# Patient Record
Sex: FEMALE | Race: WHITE
[De-identification: names, ages, dates, MRNs, and addresses within clinical notes are randomized per-mention and may not be internally consistent; named-entity substitution may affect disease eponyms.]

---

## 2017-01-08 ENCOUNTER — HOSPITAL ENCOUNTER (EMERGENCY)
Dept: HOSPITAL 9 - MADERS | Age: 22
Discharge: LEFT BEFORE DISCHARGE | End: 2017-01-08
Payer: COMMERCIAL

## 2017-01-08 DIAGNOSIS — Z79.899: ICD-10-CM

## 2017-01-08 DIAGNOSIS — R10.13: Primary | ICD-10-CM

## 2017-01-08 DIAGNOSIS — F41.9: ICD-10-CM

## 2017-01-08 DIAGNOSIS — E66.01: ICD-10-CM

## 2017-01-08 DIAGNOSIS — K21.9: ICD-10-CM

## 2017-01-08 DIAGNOSIS — E03.9: ICD-10-CM

## 2017-01-08 DIAGNOSIS — I10: ICD-10-CM

## 2017-01-08 DIAGNOSIS — F40.00: ICD-10-CM

## 2017-01-08 DIAGNOSIS — F31.9: ICD-10-CM

## 2017-01-08 PROCEDURE — 99284 EMERGENCY DEPT VISIT MOD MDM: CPT

## 2017-01-08 NOTE — ERRECORD
[facility]

                                EMERGENCY RECORD



HPI ABDOMINAL PAIN (15:23 LLDO)

CHIEF COMPLAINTS: Patient presents for evaluation of

      abdominal pain, Patient presents for evaluation of see

      triage note. pain mostly in the epigastrum withsome in both UQs.

      intermittent. 10 at worst but only 8 now (smiling). no diarrhea but

      has had n/v and some fever (s). supine and still makes it better.

      moving about makes it worse. only abdo surgery was  last

      year. baby still in [place] for bradycardia. HISTORIAN:

      History provided by patient, History provided by patient's

      family. LOCATION FEMALE: Symptoms are localized.

      QUALITY: Pain is dull in nature, described

      as aching, described as cramping, Described as similar

      to previous episodes, similar pain once before, dx's asgastritis.

      SEVERITY: Maximum severity of symptoms severe,

      Currently symptoms are moderate, does not seem to be

      in any discomfort at this time. TIME COURSE:

      Sudden onset of symptoms, Symptoms are

      intermittent, There has been no change in the patient's

      symptoms over time. ASSOCIATED WITH FEMALE: No

      associated recent antibiotic use, No associated bright red blood per

      rectum, No associated chills, No associated constipation, No

      associated diarrhea, Associated with fever, No associated

      flank pain, No associated groin pain, No associated hematemesis, No

      associated hematuria, Associated with loss of appetite,

      Associated with nausea, No associated trauma, No associated

      recent travel, No associated inability to tolerate oral intake, No

      associated urinary tract infection signs or symptoms, Associated

      with vomiting. MODIFYING FACTORS FEMALE:  2

      months post partum. RELIEVED BY: Patient's condition

      relieved by remaining still, Patient's condition relieved by supine

      position. EXACERBATED BY: Patient's condition

      exacerbated by movement, Patient's condition exacerbated by

      upright position, Patient's condition exacerbated by

      walking. RISK FACTORS FEMALE: No ectopic risk factors

      present, No abdominal aortic aneurysm risk factors, No coronary

      artery disease risk factors.



ROS

CONSTITUTIONAL: Historian reports fatigue, reports

      fever. Subjective fever of "LOW GRADE". (15:29 LLDO)

EYES: Negative eye review of systems, Historian denies eye pain,

      denies eye redness, denies eye discharge. (15:37 LLDO)

ENT: Negative ears, nose, throat review of systems, Historian

      denies epistaxis, denies rhinorrhea, denies sinus pain, denies sore

      throat. (15:37 LLDO)

CARDIOVASCULAR: Negative cardiovascular review of systems,

      Historian denies chest pain, no radiation, Historian denies

      diaphoresis, denies syncope. (15:37 LLDO)

RESPIRATORY: Negative respiratory review of systems, Historian

      denies cough, denies shortness of breath, denies sputum. (15:37 LLDO)



&a-1R&a+25V*p+0X*b4473G*c202B*c15G*c2P*p-0X&a-25V&a+1R          Name: Marlee Cardoso  : 1995 F21 MedRec: [id number]

                             AcctNum: [id number]

  Prepared: Sun 2017 15:41 by Interface                 Page 1 of 5

                                      pMD

                        [facility]

                                EMERGENCY RECORD



GI: Historian reports abdominal pain, reports

      anorexia, reports appetite changes, denies

      constipation, denies diarrhea, denies hematemesis, denies

      hematochezia, denies jaundice, denies melena, reports

      nausea, denies stool changes, reports vomiting. (15:29

      LLDO)

GENITOURINARY FEMALE: Negative genitourinary review of systems,

      Historian denies dysuria, denies frequency, denies urgency. (15:37

      LLDO)

MUSCULOSKELETAL: Negative musculoskeletal review of systems,

      Historian denies arthralgias, denies back pain, denies injury, denies

      myalgias, denies neck pain. (15:37 LLDO)

SKIN: Negative skin review of systems, Historian denies

      cellulitis, denies rash, denies skin changes, denies skin lesions.

      (15:37 LLDO)

NEUROLOGIC: Negative neurologic review of systems, Historian

      denies confusion, denies dizziness, denies focal weakness, denies

      mental status changes. (15:37 LLDO)

HEMO/LYMPHATIC: Normal hematologic/lymphatic system review,

      Historian denies abnormal blood clotting, denies gum bleeding, denies

      petechiae. (15:37 LLDO)

ALLERGIC/IMMUNOLOGIC: Normal allergy/immunologic system review,

      Historian denies eczema, denies environmental allergies, denies food

      allergies. (15:37 LLDO)

PSYCHIATRIC: Negative psychiatric review of systems, Historian

      denies alcohol abuse, denies anxiety, denies depression, denies drug

      abuse, denies hallucinations. (15:37 LLDO)

NOTES: All systems reviewed, negative except as described above.

      (15:29 LLDO)



PAST MEDICAL HISTORY

MEDICAL HISTORY: Notes: VERIFIED 17, Notes:

      MORBID OBESITY, Flu vaccine not up to date, Tetanus immunization up

      to date, Pneumococcal vaccine not up to date, Past medical history

      includes endocrine disease, hypothyroidism, Past medical history

      includes gastrointestinal disease, gastroesophageal reflux disease,

      Past medical history includes neurological disease, generalized

      seizures, Past medical history includes endocrine disease,

      hypothyroidism, Past medical history includes history of

      hypertension, Past medical history includes neurological disease,

      generalized seizures. (Sun 2017 14:59 JPER)

FEMALE SURGICAL HISTORY:  VERIFIED 17,

      Surgical history of  section. (Sun 2017

      14:59 JPER)

PSYCHIATRIC HISTORY: Notes: VERIFIED 17,

      Psychiatric history includes, anxiety, bipolar disorder,

      depression, Agoraphobia, Psychiatric history includes patient

      currently being under outpatient treatment. ([place] 2017

      14:59 JPER)

SOCIAL HISTORY: Social History includes VERIFIED



&a-1R&a+25V*p+0X*[id number]*c202B*c15G*c2P*p-0X&a-25V&a+1R          Name: Marlee Cardoso  : 1995 F21 MedRec: [id number]

                             AcctNum: [id number]

  Prepared: Sun 2017 15:41 by Interface                 Page 2 of 5

                                      pMD

                        [facility]

                                EMERGENCY RECORD



      17, Patient denies alcohol use, Patient denies drug

      use, Patient has no smoking history, Lives at home, with family,

      Patient denies alcohol use, Patient denies drug use, Patient has no

      smoking history, Lives at home, with family. ([place] 2017

      14:59 JPER)

NOTES: Nursing records reviewed, Agree with nursing records,

      Medication list reviewed. (15:37 LLDO)



KNOWN ALLERGIES

No Known Allergies (Unconfirmed)

Sulfa (Sulfonamide Antibiotics): - ENTERED 3/24/2015



CURRENT MEDICATIONS

levothyroxine: 

      TABLET : Strength - 150 mcg : ORAL

      Patient Dose: 1 tab(s) Oral once a day (in the morning).

      (15:00 JPER)

Keppra: 

      TABLET : Strength - 1,000 mg : ORAL

      Patient Dose: 1250 mg Oral 2 times a day. (15:00 JPER)

Benadryl: 

      CAPSULE : Strength - 25 mg : ORAL

      Patient Dose: 1 cap(s) Oral once a day (at bedtime). (15:00

      JPER)

Depakote: 

      TABLET, DELAYED RELEASE (ENTERIC COATED) : Strength - 250 mg : ORAL

      Patient Dose: Unknown. (15:00 JPER)

None (15:01 JPER)

Ativan: 

      TABLET : Strength - 1 mg : ORAL

      Patient Dose: 0.25 3 times a day. (15:01 JPER)



VITAL SIGNS (14:55 JPER)

VITAL SIGNS: BP: 117/62, Pulse: 90, Resp: 20, Temp: 98.5 (Oral),

      Pain: 8, O2 sat: 98 on Room Air, Time: 2017 14:55.



PHYSICAL EXAM

CONSTITUTIONAL: Vital Signs Reviewed, Patient afebrile, Pulse

      normal, Blood pressure normal, Respiratory rate normal, Patient

      appears, uncomfortable, Patient appears,

      in moderate pain distress, Patient alert and oriented to

      person, place and time, Nursing notes reviewed. (15:30 LLDO)

HEAD: Head exam normal, Head exam included findings of head

      atraumatic, normocephalic. (15:37 LLDO)

EYES: Eye exam normal, Eye exam included findings of eyelids

      normal to inspection, Pupils equally round and reactive to light,

      Extraocular muscles intact. (15:37 LLDO)

ENT: ENT exam normal, Ear exam normal, Nose exam normal. (15:37

      LLDO)

NECK: Neck exam normal, Neck exam included findings of normal



&a-1R&a+25V*p+0X*c5455L*c202B*c15G*c2P*p-0X&a-25V&a+1R          Name: Marlee Cardoso  : 1995 F21 MedRec: [id number]

                             AcctNum: [id number]

  Prepared: Sun 2017 15:41 by Interface                 Page 3 of 5

                                      pMD

                        [facility]

                                EMERGENCY RECORD



      range of motion, Trachea midline, no meningeal signs, no tenderness.

      (15:37 LLDO)

RESPIRATORY CHEST: Respiratory and chest exam normal,

      Respiratory exam included findings of, Chest exam included

      findings of chest movement symmetrical, Chest expansion equal. (15:37

      LLDO)

CARDIOVASCULAR: Cardiovascular assessment normal, Cardiovascular

      exam included findings of heart rate regular rate and rhythm, Heart

      sounds normal. (15:37 LLDO)

ABDOMEN FEMALE: Abdominal exam included findings of abdomen

      tender, to the epigastric region, moderate

      intensity, Bowel sounds, hyperactive, Liver

      normal, Spleen normal, no mass, no pulsatile masses, no peritoneal

      signs. (15:30 LLDO)

BACK: Back exam normal, Back exam included findings of normal

      inspection, range of motion normal. (15:37 LLDO)

UPPER EXTREMITY: Upper extremity exam normal, Upper extremity

      exam included findings of inspection normal, Range of motion normal.

      (15:37 LLDO)

LOWER EXTREMITY: Lower extremity exam normal, Lower extremity

      exam included findings of inspection normal, Range of motion normal.

      (15:37 LLDO)

NEURO: Neuro exam normal, Neuro exam findings include patient

      oriented to person, place and time, Speech normal, Loulou coma scale

      15. (15:37 LLDO)

SKIN: Skin exam normal, Skin exam included findings of skin warm,

      dry, and normal in color, no rash. (15:37 LLDO)

PSYCHIATRIC: Psychiatric exam normal, Psychiatric exam included

      findings of patient oriented to person place and time, Normal affect.

      (15:37 LLDO)



MEDICATION ADMINISTRATION SUMMARY



      Drug Name: *GI COCKTAIL- GREEN, Dose Ordered: * , Route: Oral,

      Status: Ordered, Time: 15:17 2017,

      Drug Name: Protonix intravenous, Dose Ordered: 40 mg, Route: IV Push,

      Status: Ordered, Time: 15:16 2017,

      Drug Name: Toradol injection, Dose Ordered: 30 mg, Route: IV Push,

      Status: Ordered, Time: 15:15 2017,

      Drug Name: Zofran intravenous, Dose Ordered: 8 mg, Route: IV Push,

      Status: Ordered, Time: 15:15 2017, *Additional information

      available in notes, Detailed record available in Medication Service

      section.



DOCTOR NOTES (15:21 LLDO)

TEXT:  pt apparently walked out the door--w/o telling

      anybody--just after the hx and pe were done.



PROBLEM LIST

  No recorded problems



&a-1R&a+25V*p+0X*[id number]*c202B*c15G*c2P*p-0X&a-25V&a+1R          Name: Marlee Cardoso  : 1995 F21 MedRec: [id number]

                             AcctNum: [id number]

  Prepared: Sun 2017 15:41 by Interface                 Page 4 of 5

                                      pMD

                        [facility]

                                EMERGENCY RECORD





DIAGNOSIS (15:33 JPER)

FINAL: PRIMARY: AMA.



PRESCRIPTION

  No recorded prescriptions



DISPOSITION (15:33 JPER)

PATIENT:  Disposition Type: Eloped, Disposition: Against Medical

      Advice, Patient left the department.

[place]:

  OMERO=MATTHIAS Alcantar, Lorraine  LLDO=MD Tabitha, Francisco















































































&a-1R&a+25V*p+0X*[id number]*c202B*c15G*c2P*p-0X&a-25V&a+1R          Name: Marlee Cardoso  : 1995 F21 MedRec: [id number]

                             AcctNum: [id number]

  Prepared: Sun 2017 15:41 by Interface                 Page 5 of 5

                                      pMD

MTDD

## 2017-01-08 NOTE — PICIS
[facility]

                                EMERGENCY RECORD



TRIAGE (Sun 2017 14:59 JPER)

PATIENT: NAME: Marlee Cardoso, AGE: 21, GENDER: female, :

      Mon Mar 24, 1995, TIME OF GREET: Sun 2017 14:45, PREFERRED

      LANGUAGE: English, RACE: WHITE, ETHNICITY: Not  or ,

      FALL RISK: NO, ECODE BILLING MAP: [facility], SSN:

      [id number], Zip Code: [address], KG WEIGHT: 131.54, PHONE: [phone],

      MEDICAL RECORD NUMBER: [id number], ACCOUNT NUMBER: [id number],

      PERSON ID: [id number], PCP: NO PCP. (Sun 2017 14:59 JPER)

COMPLAINT:  ABDOMINAL PAIN,NAUSEA. (Sun 2017 14:59 JPER)

ADMISSION: URGENCY: 3 Urgent, ADMISSION SOURCE: Home, TRANSPORT:

      Walk-in, BED: ED -05. (Sun 2017 14:59 JPER)

ASSESSMENT: Assessment: PT C/O EPIGASTRIC PAIN X 2 DAYS; NAUSEA

      WITHOUT VOMITING. (Sun 2017 14:59 JPER)

PAIN: Patient complains of pain described as,

      burning, pressure, sharp, on a

      scale 0-10 patient rates pain as 8, Pain is constant,

      No aggravating factors, No relieving factors. (Sun 2017 14:59

      JPER)

IMMUNIZATIONS: Flu vaccine up to date, Tetanus immunization up to

      date. (Sun 2017 14:59 JPER)

SIRS SCORING: Heart Rate  (0), Temp range 96.8-101.1 (0),

      respiratory rate 12-24 (0), Mental Status altered: no (0). (Sun Reggie

      08, 2017 14:59 JPER)

TRIAGE SCREENING: Patient denies suicidal ideation, Patient

      denies presence of domestic violence. (Sun 2017 14:59 JPER)

LMP: Last menstrual period: 12-15-16, , P: 1. (Sun Reggie 08,

      2017 14:59 JPER)

PROVIDERS: TRIAGE NURSE: oLrraine Alcantar RN. (Sun 2017 14:59

      JPER)

VITAL SIGNS: /62, Pulse 90, Resp 20, Temp 98.5, (Oral),

      Pain 8, O2 Sat 98, on Room Air, Time 2017 14:55. (14:55 JPER)

PREVIOUS VISIT ALLERGIES: Sulfa (Sulfonamide Antibiotics). (Sun

      2017 14:59 JPER)



KNOWN ALLERGIES

No Known Allergies (Unconfirmed)

Sulfa (Sulfonamide Antibiotics): - ENTERED 3/24/2015



CURRENT MEDICATIONS

levothyroxine: 

      TABLET : Strength - 150 mcg : ORAL

      Patient Dose: 1 tab(s) Oral once a day (in the morning).

      (15:00 JPER)

Keppra: 

      TABLET : Strength - 1,000 mg : ORAL

      Patient Dose: 1250 mg Oral 2 times a day. (15:00 JPER)

Benadryl: 

      CAPSULE : Strength - 25 mg : ORAL

      Patient Dose: 1 cap(s) Oral once a day (at bedtime). (15:00

      JPER)



&a-1R&a+25V*p+0X*e8296D*c202B*c15G*c2P*p-0X&a-25V&a+1R          Name: Marlee Cardoso YANA  : 1995 F21 MedRec: [id number]

                             AcctNum: [id number]

  Prepared: Sun 2017 15:48 by Interface                 Page 1 of 7

                                      pMD

                        [facility]

                                EMERGENCY RECORD



Depakote: 

      TABLET, DELAYED RELEASE (ENTERIC COATED) : Strength - 250 mg : ORAL

      Patient Dose: Unknown. (15:00 JPER)

None (15:01 JPER)

Ativan: 

      TABLET : Strength - 1 mg : ORAL

      Patient Dose: 0.25 3 times a day. (15:01 JPER)



VITAL SIGNS (14:55 JPER)

VITAL SIGNS: BP: 117/62, Pulse: 90, Resp: 20, Temp: 98.5 (Oral),

      Pain: 8, O2 sat: 98 on Room Air, Time: 2017 14:55.



NURSING ASSESSMENT: ABDOMEN (15:00 JPER)

CONSTITUTIONAL: Patient arrives ambulatory, Gait steady, History

      obtained from patient, Patient appears comfortable, Patient

      cooperative, Patient alert, Oriented to person, place and time, Skin

      warm, Skin dry, Skin normal in color, Mucous membranes pink, Mucous

      membranes moist, Patient is well-groomed, Patient complains of

      EPIGASTRIC PAIN.

PAIN: burning pain, pressure pain, sharp

      pain, to the epigastric region, on a scale 0-10

      patient rates pain as 8.

LMP: First day last menstrual period, Last period started on

      12/15/2016.

GENITOURINARY FEMALE: Notes: DEFERRED; UA REQUESTED.

NOTES: Emotional support needed and given.



NURSING PROCEDURE: NURSE NOTES (15:25 JPER)

NURSES NOTES: Notes: ROOM FOUND EMPTY; PT ELOPED.



ORDER DETAILS



      Order Name: Amylase, Status: Active, Time: 15:04 2017, User:

      [email or web address],

       - Ordered for: MD Lu Lloyd,

       - Entered by: MD Lu Lloyd - Radhika 2017 15:04,

       - Quantity: 1,

      Order Name: CBC with Differential, Status: Active, Time: 15:04

      2017, User: [email or web address],

       - Ordered for: MD Lu Lloyd,

       - Entered by: MD Lu Lloyd - Radhika 2017 15:04,

       - Quantity: 1,

      Order Name: Comprehensive Metabolic Panel, Status: Active, Time:

      15:04 2017, User: [email or web address],

       - Ordered for: MD Lu Lloyd,

       - Entered by: MD Lu Lloyd - Radhika 2017 15:04,

       - Quantity: 1,

      Order Name: Culture, Urine, Status: Active, Time: 15:04 2017,

      User: [email or web address],

       - Ordered for: MD Lu Lloyd,



&a-1R&a+25V*p+0X*l5589A*c202B*c15G*c2P*p-0X&a-25V&a+1R          Name: Marlee Cardoso  : 1995 F21 MedRec: [id number]

                             AcctNum: [id number]

  Prepared: Sun 2017 15:48 by Interface                 Page 2 of 7

                                      pMD

                        [facility]

                                EMERGENCY RECORD



       - Entered by: MD Lu Lloyd - Radhika 2017 15:04,

       - Quantity: 1,

      Order Name: Lipase, Status: Active, Time: 15:04 2017, User: [email or web address],

       - Ordered for: MD Lu Lloyd,

       - Entered by: MD Lu Lloyd - Sun 2017 15:04,

       - Quantity: 1,

      Order Name: Pregnancy Test, Serum (BHCG), Status: Active, Time: 15:19

      2017, User: [email or web address],

       - Ordered for: MD Lu Lloyd,

       - Entered by: MD Lu Lloyd - Sun 2017 15:19,

       - Quantity: 1,

      Order Name: SALINE LOCK, Status: Active, Time: 15:04 2017, User:

      [email or web address],

       - Ordered for: MD Lu Lloyd,

       - Entered by: MD Lu Lloyd - Sun 2017 15:04,

       - Quantity: 1,

      Order Name: Urinalysis with Microscopic, Status: Active, Time: 15:04

      2017, User: [email or web address],

       - Ordered for: MD Lu Lloyd,

       - Entered by: MD Tabitha, Francisco Grarido 2017 15:04,

       - Quantity: 1.



MEDICATION ADMINISTRATION SUMMARY



      Drug Name: *GI COCKTAIL- GREEN, Dose Ordered: * , Route: Oral,

      Status: Ordered, Time: 15:17 2017,

      Drug Name: Protonix intravenous, Dose Ordered: 40 mg, Route: IV Push,

      Status: Ordered, Time: 15:16 2017,

      Drug Name: Toradol injection, Dose Ordered: 30 mg, Route: IV Push,

      Status: Ordered, Time: 15:15 2017,

      Drug Name: Zofran intravenous, Dose Ordered: 8 mg, Route: IV Push,

      Status: Ordered, Time: 15:15 2017, *Additional information

      available in notes, Detailed record available in Medication Service

      section.



MEDICATION SERVICE

GI COCKTAIL- GREEN:  Free Text order: GI COCKTAIL- GREEN : Dose:

      40 mL : Oral Donnatal (phenobarbital/Hyoscyamine Sulfate/atropine

      sulfate/Scopolamine Hydrobromide) [10 mL] Lidocaine Viscous

      (lidocaine HCl) [10 mL] MAG-AL (magnesium hydr : Oral

      Ordered by: Francisco Lu MD

      Entered by: MD Radhika Rutherford 2017 15:17 .

Protonix intravenous:  Order: Protonix intravenous (pantoprazole

      sodium) - Dose: 40 mg : IV Push

      Schedule: Now

      Ordered by: Francisco Lu MD

      Entered by: MD Radhika Rutherford 2017 15:16 .

Toradol injection:  Order: Toradol injection (ketorolac

      tromethamine) - Dose: 30 mg : IV Push

      Schedule: Now



&a-1R&a+25V*p+0X*p4151G*c202B*c15G*c2P*p-0X&a-25V&a+1R          Name: Marlee Cardoso  : 1995 F21 MedRec: [id number]

                             AcctNum: [id number]

  Prepared: Sun 2017 15:48 by Interface                 Page 3 of 7

                                      pMD

                        [facility]

                                EMERGENCY RECORD



      Ordered by: Francisco Lu MD

      Entered by: MD Radhika Rutherford 2017 15:15 .

Zofran intravenous:  Order: Zofran intravenous (ondansetron HCl)

      - Dose: 8 mg : IV Push

      Schedule: Now

      Ordered by: Francisco Lu MD

      Entered by: MD Radhika Rutherford 2017 15:15 .



HPI ABDOMINAL PAIN (15:23 LLDO)

CHIEF COMPLAINTS: Patient presents for evaluation of

      abdominal pain, Patient presents for evaluation of see

      triage note. pain mostly in the epigastrum withsome in both UQs.

      intermittent. 10 at worst but only 8 now (smiling). no diarrhea but

      has had n/v and some fever (s). supine and still makes it better.

      moving about makes it worse. only abdo surgery was  last

      year. baby still in [place] for bradycardia. HISTORIAN:

      History provided by patient, History provided by patient's

      family. LOCATION FEMALE: Symptoms are localized.

      QUALITY: Pain is dull in nature, described

      as aching, described as cramping, Described as similar

      to previous episodes, similar pain once before, dx's asgastritis.

      SEVERITY: Maximum severity of symptoms severe,

      Currently symptoms are moderate, does not seem to be

      in any discomfort at this time. TIME COURSE:

      Sudden onset of symptoms, Symptoms are

      intermittent, There has been no change in the patient's

      symptoms over time. ASSOCIATED WITH FEMALE: No

      associated recent antibiotic use, No associated bright red blood per

      rectum, No associated chills, No associated constipation, No

      associated diarrhea, Associated with fever, No associated

      flank pain, No associated groin pain, No associated hematemesis, No

      associated hematuria, Associated with loss of appetite,

      Associated with nausea, No associated trauma, No associated

      recent travel, No associated inability to tolerate oral intake, No

      associated urinary tract infection signs or symptoms, Associated

      with vomiting. MODIFYING FACTORS FEMALE:  2

      months post partum. RELIEVED BY: Patient's condition

      relieved by remaining still, Patient's condition relieved by supine

      position. EXACERBATED BY: Patient's condition

      exacerbated by movement, Patient's condition exacerbated by

      upright position, Patient's condition exacerbated by

      walking. RISK FACTORS FEMALE: No ectopic risk factors

      present, No abdominal aortic aneurysm risk factors, No coronary

      artery disease risk factors.



ROS

CONSTITUTIONAL: Historian reports fatigue, reports

      fever. Subjective fever of "LOW GRADE". (15:29 LLDO)

EYES: Negative eye review of systems, Historian denies eye pain,

      denies eye redness, denies eye discharge. (15:37 LLDO)



&a-1R&a+25V*p+0X*z5179B*c202B*c15G*c2P*p-0X&a-25V&a+1R          Name: Marlee Cardoso  : 1995 F21 MedRec: [id number]

                             AcctNum: [id number]

  Prepared: Sun 2017 15:48 by Interface                 Page 4 of 7

                                      pMD

                        [facility]

                                EMERGENCY RECORD



ENT: Negative ears, nose, throat review of systems, Historian

      denies epistaxis, denies rhinorrhea, denies sinus pain, denies sore

      throat. (15:37 LLDO)

CARDIOVASCULAR: Negative cardiovascular review of systems,

      Historian denies chest pain, no radiation, Historian denies

      diaphoresis, denies syncope. (15:37 LLDO)

RESPIRATORY: Negative respiratory review of systems, Historian

      denies cough, denies shortness of breath, denies sputum. (15:37 LLDO)

GI: Historian reports abdominal pain, reports

      anorexia, reports appetite changes, denies

      constipation, denies diarrhea, denies hematemesis, denies

      hematochezia, denies jaundice, denies melena, reports

      nausea, denies stool changes, reports vomiting. (15:29

      LLDO)

GENITOURINARY FEMALE: Negative genitourinary review of systems,

      Historian denies dysuria, denies frequency, denies urgency. (15:37

      LLDO)

MUSCULOSKELETAL: Negative musculoskeletal review of systems,

      Historian denies arthralgias, denies back pain, denies injury, denies

      myalgias, denies neck pain. (15:37 LLDO)

SKIN: Negative skin review of systems, Historian denies

      cellulitis, denies rash, denies skin changes, denies skin lesions.

      (15:37 LLDO)

NEUROLOGIC: Negative neurologic review of systems, Historian

      denies confusion, denies dizziness, denies focal weakness, denies

      mental status changes. (15:37 LLDO)

HEMO/LYMPHATIC: Normal hematologic/lymphatic system review,

      Historian denies abnormal blood clotting, denies gum bleeding, denies

      petechiae. (15:37 LLDO)

ALLERGIC/IMMUNOLOGIC: Normal allergy/immunologic system review,

      Historian denies eczema, denies environmental allergies, denies food

      allergies. (15:37 LLDO)

PSYCHIATRIC: Negative psychiatric review of systems, Historian

      denies alcohol abuse, denies anxiety, denies depression, denies drug

      abuse, denies hallucinations. (15:37 LLDO)

NOTES: All systems reviewed, negative except as described above.

      (15:29 LLDO)



PAST MEDICAL HISTORY

MEDICAL HISTORY: Notes: VERIFIED 17, Notes:

      MORBID OBESITY, Flu vaccine not up to date, Tetanus immunization up

      to date, Pneumococcal vaccine not up to date, Past medical history

      includes endocrine disease, hypothyroidism, Past medical history

      includes gastrointestinal disease, gastroesophageal reflux disease,

      Past medical history includes neurological disease, generalized

      seizures, Past medical history includes endocrine disease,

      hypothyroidism, Past medical history includes history of

      hypertension, Past medical history includes neurological disease,

      generalized seizures. (Sun 2017 14:59 JPER)

FEMALE SURGICAL HISTORY:  VERIFIED 17,



&a-1R&a+25V*p+0X*r1714V*c202B*c15G*c2P*p-0X&a-25V&a+1R          Name: Marlee Cardoso YANA  : 1995 F21 MedRec: [id number]

                             AcctNum: [id number]

  Prepared: Sun 2017 15:48 by Interface                 Page 5 of 7

                                      pMD

                        [facility]

                                EMERGENCY RECORD



      Surgical history of  section. (Sun 2017

      14:59 JPER)

PSYCHIATRIC HISTORY: Notes: VERIFIED 17,

      Psychiatric history includes, anxiety, bipolar disorder,

      depression, Agoraphobia, Psychiatric history includes patient

      currently being under outpatient treatment. ([place] 2017

      14:59 JPER)

SOCIAL HISTORY: Social History includes VERIFIED

      17, Patient denies alcohol use, Patient denies drug

      use, Patient has no smoking history, Lives at home, with family,

      Patient denies alcohol use, Patient denies drug use, Patient has no

      smoking history, Lives at home, with family. ([place] 2017

      14:59 JPER)

NOTES: Nursing records reviewed, Agree with nursing records,

      Medication list reviewed. (15:37 LLDO)



PHYSICAL EXAM

CONSTITUTIONAL: Vital Signs Reviewed, Patient afebrile, Pulse

      normal, Blood pressure normal, Respiratory rate normal, Patient

      appears, uncomfortable, Patient appears,

      in moderate pain distress, Patient alert and oriented to

      person, place and time, Nursing notes reviewed. (15:30 LLDO)

HEAD: Head exam normal, Head exam included findings of head

      atraumatic, normocephalic. (15:37 LLDO)

EYES: Eye exam normal, Eye exam included findings of eyelids

      normal to inspection, Pupils equally round and reactive to light,

      Extraocular muscles intact. (15:37 LLDO)

ENT: ENT exam normal, Ear exam normal, Nose exam normal. (15:37

      LLDO)

NECK: Neck exam normal, Neck exam included findings of normal

      range of motion, Trachea midline, no meningeal signs, no tenderness.

      (15:37 LLDO)

RESPIRATORY CHEST: Respiratory and chest exam normal,

      Respiratory exam included findings of, Chest exam included

      findings of chest movement symmetrical, Chest expansion equal. (15:37

      LLDO)

CARDIOVASCULAR: Cardiovascular assessment normal, Cardiovascular

      exam included findings of heart rate regular rate and rhythm, Heart

      sounds normal. (15:37 LLDO)

ABDOMEN FEMALE: Abdominal exam included findings of abdomen

      tender, to the epigastric region, moderate

      intensity, Bowel sounds, hyperactive, Liver

      normal, Spleen normal, no mass, no pulsatile masses, no peritoneal

      signs. (15:30 LLDO)

BACK: Back exam normal, Back exam included findings of normal

      inspection, range of motion normal. (15:37 LLDO)

UPPER EXTREMITY: Upper extremity exam normal, Upper extremity

      exam included findings of inspection normal, Range of motion normal.

      (15:37 LLDO)

LOWER EXTREMITY: Lower extremity exam normal, Lower extremity



&a-1R&a+25V*p+0X*e7068X*c202B*c15G*c2P*p-0X&a-25V&a+1R          Name: Marlee Cardoso  : 1995 11 MedRec: [id number]

                             AcctNum: [id number]

  Prepared: Sun 2017 15:48 by Interface                 Page 6 of 7

                                      pMD

                        [facility]

                                EMERGENCY RECORD



      exam included findings of inspection normal, Range of motion normal.

      (15:37 LLDO)

NEURO: Neuro exam normal, Neuro exam findings include patient

      oriented to person, place and time, Speech normal, [place] coma scale

      15. (15:37 LLDO)

SKIN: Skin exam normal, Skin exam included findings of skin warm,

      dry, and normal in color, no rash. (15:37 LLDO)

PSYCHIATRIC: Psychiatric exam normal, Psychiatric exam included

      findings of patient oriented to person place and time, Normal affect.

      (15:37 LLDO)



EVENTS

TRANSFER:  Triage to Emergency Main ED -05. (14:59 JPER)

   Removed from Emergency Main ED -05. (15:33 JPER)



DOCTOR NOTES (15:21 LLDO)

TEXT:  pt apparently walked out the door--w/o telling

      anybody--just after the hx and pe were done.



PROBLEM LIST

  No recorded problems



DIAGNOSIS (15:33 JPER)

FINAL: PRIMARY: AMA.



DISPOSITION (15:33 JPER)

PATIENT:  Disposition Type: Eloped, Disposition: Against Medical

      Advice, Patient left the department.



PRESCRIPTION

  No recorded prescriptions



IMAGING (15:30 JPER)

AMA/LWBS:  Image captured from scanner.

*SUPPLY CHARGE SHEET:  Image captured from scanner.



ADMIN (15:40 LLDO)

DIGITAL SIGNATURE:  MD Tabitha, Francisco.

   MD Tabitha, Francisco.

[place]:

  JPER=MATTHIAS Alcantar, Lorraine  LLDO=MD Tabitha, Francisco

















&a-1R&a+25V*p+0X*j4946V*c202B*c15G*c2P*p-0X&a-25V&a+1R          Name: Marlee Cardoso  : 1995 11 MedRec: [id number]

                             AcctNum: [id number]

  Prepared: Sun 2017 15:48 by Interface                 Page 7 of 7

                                      pMD

MTDD

## 2017-02-02 ENCOUNTER — HOSPITAL ENCOUNTER (EMERGENCY)
Dept: HOSPITAL 9 - MADERS | Age: 22
Discharge: HOME | End: 2017-02-02
Payer: COMMERCIAL

## 2017-02-02 DIAGNOSIS — Z79.899: ICD-10-CM

## 2017-02-02 DIAGNOSIS — I10: ICD-10-CM

## 2017-02-02 DIAGNOSIS — N39.0: ICD-10-CM

## 2017-02-02 DIAGNOSIS — E03.9: ICD-10-CM

## 2017-02-02 DIAGNOSIS — K21.9: ICD-10-CM

## 2017-02-02 DIAGNOSIS — E66.01: ICD-10-CM

## 2017-02-02 DIAGNOSIS — F41.9: ICD-10-CM

## 2017-02-02 DIAGNOSIS — B34.9: Primary | ICD-10-CM

## 2017-02-02 DIAGNOSIS — G40.409: ICD-10-CM

## 2017-02-02 DIAGNOSIS — F31.9: ICD-10-CM

## 2017-02-02 LAB
ALBUMIN SERPL BCG-MCNC: 4 G/DL (ref 3.5–5)
ALP SERPL-CCNC: 67 U/L (ref 40–150)
ALT SERPL W P-5'-P-CCNC: 32 U/L (ref 0–55)
ANION GAP SERPL CALC-SCNC: 19 MMOL/L (ref 10–20)
AST SERPL-CCNC: 19 U/L (ref 5–30)
BACTERIA UR QL AUTO: (no result) HPF
BASOPHILS # BLD AUTO: 0.1 THOU/UL (ref 0–0.2)
BASOPHILS NFR BLD AUTO: 0.7 % (ref 0–1)
BILIRUB SERPL-MCNC: 0.4 MG/DL (ref 0.2–1.2)
BUN SERPL-MCNC: 12 MG/DL (ref 8.4–21)
CALCIUM SERPL-MCNC: 9 MG/DL (ref 7.8–10.44)
CHLORIDE SERPL-SCNC: 101 MMOL/L (ref 98–107)
CO2 SERPL-SCNC: 23 MMOL/L (ref 22–29)
CREAT CL PREDICTED SERPL C-G-VRATE: 0 ML/MIN (ref 70–130)
DRUG SCREEN CUTOFF: (no result)
EOSINOPHIL # BLD AUTO: 0 THOU/UL (ref 0–0.7)
EOSINOPHIL NFR BLD AUTO: 0.1 % (ref 0–10)
GLOBULIN SER CALC-MCNC: 2.8 G/DL (ref 2.4–3.5)
GLUCOSE SERPL-MCNC: 108 MG/DL (ref 70–105)
HGB BLD-MCNC: 11.7 G/DL (ref 12–16)
LYMPHOCYTES # BLD AUTO: 1.5 THOU/UL (ref 1.2–3.4)
LYMPHOCYTES NFR BLD AUTO: 14.6 % (ref 28–48)
MCH RBC QN AUTO: 29.2 PG (ref 25–35)
MCV RBC AUTO: 84.9 FL (ref 77–87)
MEDTOX CONTROL LINE VALID?: (no result)
MONOCYTES # BLD AUTO: 0.5 THOU/UL (ref 0.11–0.59)
MONOCYTES NFR BLD AUTO: 4.6 % (ref 0–4)
NEUTROPHILS # BLD AUTO: 8 THOU/UL (ref 1.4–6.5)
NEUTROPHILS NFR BLD AUTO: 80.1 % (ref 31–61)
PLATELET # BLD AUTO: 290 THOU/UL (ref 130–400)
POTASSIUM SERPL-SCNC: 4.2 MMOL/L (ref 3.5–5.1)
PROT UR STRIP.AUTO-MCNC: 100 MG/DL
RBC # BLD AUTO: 4.02 MILL/UL (ref 4–5.2)
RBC UR QL AUTO: (no result) HPF (ref 0–3)
SODIUM SERPL-SCNC: 139 MMOL/L (ref 136–145)
SP GR UR STRIP: 1.02 (ref 1–1.03)
WBC # BLD AUTO: 10 THOU/UL (ref 4.8–10.8)
WBC UR QL AUTO: (no result) HPF (ref 0–3)

## 2017-02-02 PROCEDURE — 36415 COLL VENOUS BLD VENIPUNCTURE: CPT

## 2017-02-02 PROCEDURE — 93005 ELECTROCARDIOGRAM TRACING: CPT

## 2017-02-02 PROCEDURE — 87086 URINE CULTURE/COLONY COUNT: CPT

## 2017-02-02 PROCEDURE — 81015 MICROSCOPIC EXAM OF URINE: CPT

## 2017-02-02 PROCEDURE — 85025 COMPLETE CBC W/AUTO DIFF WBC: CPT

## 2017-02-02 PROCEDURE — 71010: CPT

## 2017-02-02 PROCEDURE — 81003 URINALYSIS AUTO W/O SCOPE: CPT

## 2017-02-02 PROCEDURE — 80053 COMPREHEN METABOLIC PANEL: CPT

## 2017-02-02 PROCEDURE — 80306 DRUG TEST PRSMV INSTRMNT: CPT

## 2017-02-02 PROCEDURE — 84443 ASSAY THYROID STIM HORMONE: CPT

## 2017-02-02 NOTE — RAD
PORTABLE CHEST:

2/2/17

 

HISTORY: 

Shortness of breath. 

 

COMPARISON:  

4/21/15 exam. 

 

Film technique is suboptimal related to body habitus. Increased density in the bases is felt to be o
n the basis of soft tissue. Heart size and mediastinum are within normal limits. The lungs are clear
 of infiltrates. 

 

IMPRESSION:  

No active intrathoracic disease. 

 

POS: [facility]

## 2017-02-02 NOTE — ERRECORD
[facility]

                                EMERGENCY RECORD



HPI COUGH (17:47 SHAN)

CHIEF COMPLAINT: Patient presents for evaluation of

      cough, non-productive. HISTORIAN: History

      provided by patient, feels like lungs tight; also heart fast.

      ongoing for a few hours. EXACERBATED BY: Patient's

      condition exacerbated by nothing. RELIEVED BY:

      Patient's condition relieved by nothing.



ROS (17:50 SHAN)

CONSTITUTIONAL: Negative constitutional review of systems,

      Historian denies chills, denies fever.

EYES: Negative eye review of systems.

ENT: Negative ears, nose, throat review of systems.

CARDIOVASCULAR: Negative cardiovascular review of systems,

      Historian denies chest pain, denies palpitations.

RESPIRATORY: Negative respiratory review of systems, Historian

      denies cough, denies shortness of breath.

GI: Negative gastrointestinal review of systems, Historian denies

      abdominal pain, denies constipation, denies diarrhea.

MUSCULOSKELETAL: Negative musculoskeletal review of systems.

SKIN: Negative skin review of systems.

NEUROLOGIC: Negative neurologic review of systems.

ENDOCRINE: Negative endocrine review of systems.

HEMO/LYMPHATIC: Normal hematologic/lymphatic system review.

PSYCHIATRIC: Negative psychiatric review of systems.

NOTES:  All other ROS is negative except as listed in

      HPI.



PAST MEDICAL HISTORY

MEDICAL HISTORY:  Notes: VERIFIED 17, Notes: MORBID

      OBESITY, Flu vaccine not up to date, Tetanus immunization up to date,

      Pneumococcal vaccine not up to date, Past medical history includes

      endocrine disease, hypothyroidism, Past medical history includes

      gastrointestinal disease, gastroesophageal reflux disease, Past

      medical history includes neurological disease, generalized seizures,

      Past medical history includes endocrine disease, hypothyroidism, Past

      medical history includes history of hypertension, Past medical

      history includes neurological disease, generalized seizures.

      (17:29 VANDNAA)

FEMALE SURGICAL HISTORY:  VERIFIED 17, Surgical history

      of  section. (17:29 VANDANA)

PSYCHIATRIC HISTORY:  Notes: VERIFIED 17, Psychiatric

      history includes, anxiety, bipolar disorder, depression, Agoraphobia,

      Psychiatric history includes patient currently being under outpatient

      treatment. (17:29 VANDANA)

SOCIAL HISTORY:  Social History includes VERIFIED 17,

      Patient denies alcohol use, Patient denies drug use, Patient has no

      smoking history, Lives at home, with family, Patient denies alcohol

      use, Patient denies drug use, Patient has no smoking history, Lives

      at home, with family. (17:29 VANDANA)



&a-1R&a+25V*p+0X*w1740M*c202B*c15G*c2P*p-0X&a-25V&a+1R          Name: Marlee Cardoso  : 1995 F21 MedRec: [id number]

                             AcctNum: [id number]

  Prepared:  19:54 by Interface                 Page 1 of 4

                                      pMD

                        [facility]

                                EMERGENCY RECORD



NOTES:  I have reviewed and agree with the PMH/PSxH/FamHx/SocHx

      obtained by the nurse. (17:50 SHAN)



KNOWN ALLERGIES

Sulfa (Sulfonamide Antibiotics): - ENTERED 3/24/2015



CURRENT MEDICATIONS (17:28 VANDANA)

levothyroxine: 

      TABLET : Strength - 150 mcg : ORAL

      Patient Dose: 1 tab(s) Oral once a day (in the morning).

Keppra: 

      TABLET : Strength - 1,000 mg : ORAL

      Patient Dose: 1250 mg Oral 2 times a day.

Benadryl: 

      CAPSULE : Strength - 25 mg : ORAL

      Patient Dose: 1 cap(s) Oral once a day (at bedtime).

Depakote: 

      TABLET, DELAYED RELEASE (ENTERIC COATED) : Strength - 250 mg : ORAL

      Patient Dose: Unknown.

Ativan: 

      TABLET : Strength - 1 mg : ORAL

      Patient Dose: 0.25 3 times a day.



VITAL SIGNS

VITAL SIGNS: BP: 117/69, Pulse: 141, Resp: 16, Temp: 100.4

      (Oral), Pain: 8, O2 sat: 96 on Room Air, Time: 2017 17:24. (17:24

      VANDANA)

  Pulse: 127, Resp: 16, O2 sat: 96 on Room Air, Time: 2017 18:39.

      (18:39 VANDANA)

  BP: 100/46, Time: 2017 18:40. (18:40 VANDANA)

  BP: 104/75, Pulse: 112, Resp: 20, Temp: 100.0, Pain: 0, O2 sat: 98 on ra,

      Time: 2017 19:40. (19:40 MCRS)



PHYSICAL EXAM (17:50 SHAN)

CONSTITUTIONAL: Vital signs reviewed, Patient appears non toxic,

      Patient alert and oriented to person, place and time, Pt is in

      no apparent distress.

HEAD: Head exam included findings of head atraumatic,

      normocephalic.

EYES: Eye exam included findings of eyelids normal to inspection,

      Pupils equally round and reactive to light, Extraocular muscles

      intact.

ENT: ENT exam normal, Nose exam normal, no nasal deformity, no

      bleeding from nares, Pharynx exam normal, Mouth exam normal, mucous

      membranes moist.

NECK: Neck exam included findings of normal range of motion,

      Trachea midline.

RESPIRATORY CHEST: Respiratory and chest exam normal, Breath

      sounds clear, No wheezing, No rales, Chest exam included findings of

      chest movement symmetrical, Chest expansion equal.



&a-1R&a+25V*p+0X*i6403B*c202B*c15G*c2P*p-0X&a-25V&a+1R          Name: Marlee Cardoso  : 1995 F21 MedRec: [id number]

                             AcctNum: [id number]

  Prepared: Thu 2017 19:54 by Interface                 Page 2 of 4

                                      pMD

                        [facility]

                                EMERGENCY RECORD



CARDIOVASCULAR: Cardiovascular assessment normal, Cardiovascular

      exam included findings of heart rate regular rate and rhythm, Heart

      sounds normal.

ABDOMEN FEMALE: Abdominal exam included findings of abdomen

      nontender, Bowel sounds normal, no mass, no pulsatile masses, no

      peritoneal signs.

BACK: Back exam included findings of normal inspection, range of

      motion normal, no costovertebral angle tenderness.

UPPER EXTREMITY: Upper extremity exam included findings of

      inspection normal, Range of motion normal.

LOWER EXTREMITY: Lower extremity exam included findings of

      inspection normal, Range of motion normal.

NEURO: Neuro exam findings include patient oriented to person,

      place and time, Speech normal, no focal motor deficits, no focal

      sensory deficits.

SKIN: Skin exam included findings of skin warm, dry, and normal

      in color.

LYMPHATIC: Lymphatic exam normal.

PSYCHIATRIC: Psychiatric exam included findings of patient

      oriented to person place and time, Normal affect.



MEDICATION ADMINISTRATION SUMMARY



      Drug Name: acetaminophen oral, Dose Ordered: 1000 mg, Route: Oral,

      Status: Canceled, Time: 17:46 2017,

      Drug Name: *Keflex, Dose Ordered: 2 tab(s), Route: Oral, Status:

      Given, Time: 19:35 2017,

      Drug Name: ibuprofen, Dose Ordered: 600 mg, Route: Oral, Status:

      Given, Time: 18:00 2017, *Additional information available in

      notes, Detailed record available in Medication Service section.



DOCTOR NOTES (19:17 SHAN)

TEXT:  viral syndrome with uti most likely from data and

      exam. Discussed with patient and family.



PROBLEM LIST

  No recorded problems



DIAGNOSIS (19:18 ENDER)

FINAL: PRIMARY: viral syndrome, ADDITIONAL:

      urinary tract infection.



PRESCRIPTION (19:19 SHAN)

Keflex:  CAPSULE : 500 mg : ORAL : Quantity: *** 1 *** Unit:

      cap(s) Route: ORAL Schedule: 4 times a day Dispense: *** 40 *** Unit:

      cap(s)

      May substitute. Refills: *** No Refills ***.

NOTES:  No Refills.



DISPOSITION



&a-1R&a+25V*p+0X*x8611X*c202B*c15G*c2P*p-0X&a-25V&a+1R          Name: Marlee Cardoso  : 1995 F21 MedRec: [id number]

                             AcctNum: [id number]

  Prepared:  19:54 by Interface                 Page 3 of 4

                                      pMD

                        [facility]

                                EMERGENCY RECORD



PATIENT:  Disposition Type: Discharge, Disposition: *Discharge

      Home. (19:18 SHAN)

   Disposition Transport: Car, Condition: Good. (19:46 MCRS)

   Patient left the department. (19:46 MCRS)

Tilley:

  VANDANA=MATTHIAS Varela, Ilda MCRS=MATTHIAS Koch, Austin HANDLEY=MD Carloz, Eladio



























































































&a-1R&a+25V*p+0X*e9743I*c202B*c15G*c2P*p-0X&a-25V&a+1R          Name: Marlee Cardoso  : 1995 F21 MedRec: [id number]

                             AcctNum: [id number]

  Prepared:  19:54 by Interface                 Page 4 of 4

                                      pMD

[facility]

## 2017-02-02 NOTE — PICIS
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 w/ Rflx Microscopic Collection DT:  18:18,

      Color Yellow , Range (Yellow),

      Clarity Hazy , Range (Clear),

      Specific Gravity, Urine 1.020 , Range (1.005-1.030),

      pH, Urine 8.0 , Range (5.0-9.0),

       , 



&a-1R&a+25V*p+0X*[id number]*c202B*c15G*c2P*p-0X&a-25V&a+1R          Name: Marlee Cardoso  : 1995 F21 MedRec: [id number]

                             AcctNum: [id number]

  Prepared:  20:01 by Interface                 Page 9 of 11

                                      pMD

                        [facility]

                                EMERGENCY RECORD



      *Leukocyte Small - H , Range (Negative),

      Nitrite Negative , Range (Negative), 

      *Protein, Urine (Dipstick) 100 - H mg/dL, Range (Neg-Trace),

      Glucose, Urine (Dipstick) Negative mg/dL, Range (Negative),

      Ketone, Urine Negative mg/dL, Range (Negative),

      Urobilinogen 1.0 mg/dL, Range (0.2-1.0),

      Bilirubin Negative , Range (Negative), 

      *Blood, Urine Trace - H , Range (Negative). (18:35 SHAN)

  Drug Screen, Urine Collection DT:  19:06,

      THC/Cannabinoid Screen Not Detected , Range (NotDetected),

      Phencyclidine (PCP) Not Detected , Range (NotDetected),

      Cocaine Metabolite Screen Not Detected , Range (NotDetected),

      Methamphetamine Not Detected , Range (NotDetected),

      Opiate Screen Not Detected , Range (NotDetected),

      Amphetamine Not Detected , Range (NotDetected), 

      *Benzodiazepine Screen Detected - H , Range (NotDetected),

      Tricyclic Screen Not Detected , Range (NotDetected),

      Methadone Not Detected , Range (NotDetected), 

      *Barbiturates Screen Detected - H , Range (NotDetected),

      Oxycodone Screen Not Detected , Range (NotDetected),

      Propoxyphene Screen Not Detected , Range (NotDetected),

      Drug Screen Cutoff ******************** , Range (***********), The

      Oncothyreon Profile-V Panel for Qualitative Drugs of

      Abuse assays are for, presumptive screening testing only.

      The drug class and detection limits, are as follows:



      Drug Class Detection Limit

      Amphetamine , 500 ng/mL*

      Barbiturates 200 ng/mL ,

      Benzodiazepines 150 ng/mL*

      Cocaine 150 ng/mL*,

      Methamphetamine 500 ng/mL*

      Methadone 200, ng/mL*

      Opiates 100 ng/mL*

      Oxycodone , 100 ng/mL

      PCP 25 ng/mL

      Propoxyphene , 300 ng/mL

      Tricyclic Antidepressants 300 ng/mL

      Cannabinoids (THC) , 50 ng/mL



      Tests which yield a presumptive positive result must be ,

      tested using a more specific alternate chemical method in

      order to obtain, a confirmed analytical result. Additional

      confirmation and identification, may be ordered on a routine

      basis, if desired. Presumptive positive urines, are held for

      two weeks.

       . (19:13 SHAN)

MICROBIOLOGY: Influenza A&B Ag Screen: [id number] Collection

      DT:  18:08,

      See comment below , @ ER ROOM#: [id number]



&a-1R&a+25V*p+0X*[id number]*c202B*c15G*c2P*p-0X&a-25V&a+1R          Name: Marlee Cardoso  : 1995 F21 MedRec: [id number]

                             AcctNum: [id number]

  Prepared:  20:01 by Interface                 Page 10 of 11

                                      pMD

                        [facility]

                                EMERGENCY RECORD



      Source: Nasal swab

      Spec Desc: ,

      Influenza A Antigen: NEGATIVE for the ,

       presence of ,

       INFLUENZA A Antigen ,

      Influenza B Antigen: NEGATIVE for the ,

       presence of ,

       INFLUENZA B Antigen , The rapid Flu A&B test can distinguish between

      influenza A ,

      Influenza A&B Ag Screen See comment below , and B viruses, but it

      does not differentiate influenza ,

      Influenza A&B Ag Screen See comment below , subtypes. ,

      Influenza A&B Ag Screen See comment below ,

      Influenza A&B Ag Screen See comment below , characteristics of this

      device with human specimens infected ,

      Influenza A&B Ag Screen See comment below , with the  H1N1

      influenza virus have not been ,

      Influenza A&B Ag Screen See comment below , established. For example:

      this test cannot distinguish ,

      Influenza A&B Ag Screen See comment below , influenza infections

      caused by novel H1N1 influenza A ,

      Influenza A&B Ag Screen See comment below , viruses versus seasonal

      influenza A viruses. ,

      Influenza A&B Ag Screen See comment below , ,

      Influenza A&B Ag Screen See comment below , A negative result does

      not exclude influenza virus ,

      Influenza A&B Ag Screen See comment below , infection; therefore, if

      more conclusive testing is desired, ,

      Influenza A&B Ag Screen See comment below , follow up confirmatory

      testing is warranted.,

      Influenza A&B Ag Screen See comment below . (19:13 ENDER)

LABORATORY: Thyroid Stimulating Hormone Collection DT:  17:51,

      Thyroid Stimulating Hormone 0.5891 uIU/mL, Range (0.35-4.94). (19:13

      ENDER)

[place]:

  VANDANA=MATTHIAS Varela, Gabriel HU=MATTHIAS Diane, Prasanth RIVERA=MATTHIAS Koch, Austin HANDLEY=MD Carloz, Eladio























&a-1R&a+25V*p+0X*[id number]*c202B*c15G*c2P*p-0X&a-25V&a+1R          Name: Marlee Cardoso  : 1995 F21 MedRec: [id number]

                             AcctNum: [id number]

  Prepared:  20:01 by Interface                 Page 11 of 11

                                      pMD

MTDD

## 2018-05-23 ENCOUNTER — HOSPITAL ENCOUNTER (OUTPATIENT)
Dept: HOSPITAL 92 - L&D/OP | Age: 23
Discharge: HOME | End: 2018-05-23
Attending: OBSTETRICS & GYNECOLOGY
Payer: COMMERCIAL

## 2018-05-23 VITALS — BODY MASS INDEX: 56.6 KG/M2

## 2018-05-23 VITALS — TEMPERATURE: 99.5 F | SYSTOLIC BLOOD PRESSURE: 130 MMHG | DIASTOLIC BLOOD PRESSURE: 56 MMHG

## 2018-05-23 DIAGNOSIS — R51: ICD-10-CM

## 2018-05-23 DIAGNOSIS — E03.9: ICD-10-CM

## 2018-05-23 DIAGNOSIS — O99.89: ICD-10-CM

## 2018-05-23 DIAGNOSIS — Z3A.30: ICD-10-CM

## 2018-05-23 DIAGNOSIS — O36.8130: Primary | ICD-10-CM

## 2018-05-23 DIAGNOSIS — O99.213: ICD-10-CM

## 2018-05-23 DIAGNOSIS — O99.343: ICD-10-CM

## 2018-05-23 DIAGNOSIS — E66.01: ICD-10-CM

## 2018-05-23 DIAGNOSIS — Z79.899: ICD-10-CM

## 2018-05-23 DIAGNOSIS — Z98.891: ICD-10-CM

## 2018-05-23 DIAGNOSIS — F31.9: ICD-10-CM

## 2018-05-23 DIAGNOSIS — Z88.2: ICD-10-CM

## 2018-05-23 DIAGNOSIS — O99.283: ICD-10-CM

## 2018-05-23 LAB
BASOPHILS # BLD AUTO: 0.1 THOU/UL (ref 0–0.2)
BASOPHILS NFR BLD AUTO: 0.9 % (ref 0–1)
EOSINOPHIL # BLD AUTO: 0 THOU/UL (ref 0–0.7)
EOSINOPHIL NFR BLD AUTO: 0.2 % (ref 0–10)
HGB BLD-MCNC: 10.7 G/DL (ref 12–16)
LYMPHOCYTES # BLD: 1.6 THOU/UL (ref 1.2–3.4)
LYMPHOCYTES NFR BLD AUTO: 19.8 % (ref 21–51)
MCH RBC QN AUTO: 29 PG (ref 27–31)
MCV RBC AUTO: 85.3 FL (ref 81–99)
MONOCYTES # BLD AUTO: 0.5 THOU/UL (ref 0.11–0.59)
MONOCYTES NFR BLD AUTO: 5.8 % (ref 0–10)
NEUTROPHILS # BLD AUTO: 5.8 THOU/UL (ref 1.4–6.5)
NEUTROPHILS NFR BLD AUTO: 73.4 % (ref 42–75)
PLATELET # BLD AUTO: 279 THOU/UL (ref 130–400)
RBC # BLD AUTO: 3.68 MILL/UL (ref 4.2–5.4)
WBC # BLD AUTO: 8 THOU/UL (ref 4.8–10.8)

## 2018-05-23 PROCEDURE — 76819 FETAL BIOPHYS PROFIL W/O NST: CPT

## 2018-05-23 PROCEDURE — 36416 COLLJ CAPILLARY BLOOD SPEC: CPT

## 2018-05-23 PROCEDURE — 99283 EMERGENCY DEPT VISIT LOW MDM: CPT

## 2018-05-23 PROCEDURE — 96360 HYDRATION IV INFUSION INIT: CPT

## 2018-05-23 PROCEDURE — 85025 COMPLETE CBC W/AUTO DIFF WBC: CPT

## 2018-05-23 PROCEDURE — 36415 COLL VENOUS BLD VENIPUNCTURE: CPT

## 2018-05-23 NOTE — ULT
BIOPHYSICAL PROFILE:

 

05/23/2018

 

PROVIDED CLINICAL HISTORY:

Decreased fetal movement.

 

FINDINGS:

A single live intrauterine gestation is documented in vertex presentation, with a heart rate of 158 b
eats per minute.  The placenta is posteriorly located.

 

FETAL MOVEMENT:  2/2

FETAL TONE:  2/2

FETAL BREATHING MOVEMENTS:  2/2

AMNIOTIC FLUID VOLUME:  2/2

TOTAL: 8/8

 

DOCUMENTED ABRAM: 20.7

 

IMPRESSION:

Normal biophysical profile.

 

POS: JOSÉ MIGUEL

## 2019-10-07 NOTE — PDOC.LDHP
Labor and Delivery H&P


Chief complaint: decreased fetal movement


HPI: 





24 y/o  at 30w2d, patient of Dr. Delacruz at [facility] in [place], 

presents with decreased FM and fever.  She describes a headache since yesterday 

and fever up to 102.6 at home.  Denies VB, abd pain, LOF, UTI sx, back pain, N/V

/d.  





ROS neg for HEENT, cv, pulm, gi, gu, neuro, psych, skin, musculoskeletal or 

constitutional sx other than mentioned above.


OB History Details: 





1 prior ELTCS at 36 weeks at [place].  Baby  at 7 months due to a 

"genetic disorder."


Current pregnancy complications: gestational diabetes (A2), other (Morbid 

obesity)


Past Medical History: 





Hypothyroidism


Morbid Obesity


Bipolar d/o


Current medications: pre- vitamins, other (Keppra, metformin (not taken), 

synthroid, prozac, Tums, Tylenol, meclazine)


Previous surgical history: low tranverse CS


Allergies/Adverse Reactions: 


 Allergies











Allergy/AdvReac Type Severity Reaction Status Date / Time


 


Sulfa (Sulfonamide Allergy   Verified 16 22:53





Antibiotics)     











Social history: none





- Physical Exam


Vital signs reviewed and normal: yes


General: NAD, resting


Heart: RRR


Lungs: CTAB


Abdomen: gravid (NTTP)


Extremeties: no edema





- OB Labs


Additional Labs: 





 Laboratory Tests











  18





  20:43 21:23


 


WBC   8.0


 


RBC   3.68 L


 


Hgb   10.7 L


 


Hct   31.4 L


 


MCV   85.3


 


MCH   29.0


 


MCHC   34.0


 


RDW   13.9


 


Plt Count   279


 


MPV   6.0 L


 


Neutrophils %   73.4


 


Lymphocytes %   19.8 L


 


Monocytes %   5.8


 


Eosinophils %   0.2


 


Basophils %   0.9


 


Neutrophils #   5.8


 


Lymphocytes #   1.6


 


Monocytes #   0.5


 


Eosinophils #   0.0


 


Basophils #   0.1


 


POC Glucose  90 














- Assessment





24 y/o  at 30w2d with BPP of 8/8.  Unable to obtain NST due to maternal 

body habitus.  Afebrile here and asymptomatic other than a headache.  No WBC 

count, no left shift.  Received Tylenol in ambulance. 





- Plan


-: 





D/c home with precautions.  Advised to keep all prenatal appointments and call 

clinic if symptoms develop.
detailed exam

## 2019-11-24 ENCOUNTER — HOSPITAL ENCOUNTER (EMERGENCY)
Dept: HOSPITAL 9 - MADERS | Age: 24
Discharge: HOME | End: 2019-11-24
Payer: MEDICARE

## 2019-11-24 DIAGNOSIS — R53.1: ICD-10-CM

## 2019-11-24 DIAGNOSIS — E03.9: ICD-10-CM

## 2019-11-24 DIAGNOSIS — F31.9: ICD-10-CM

## 2019-11-24 DIAGNOSIS — Z3A.13: ICD-10-CM

## 2019-11-24 DIAGNOSIS — R11.0: ICD-10-CM

## 2019-11-24 DIAGNOSIS — R19.7: ICD-10-CM

## 2019-11-24 DIAGNOSIS — O99.281: ICD-10-CM

## 2019-11-24 DIAGNOSIS — O99.89: Primary | ICD-10-CM

## 2019-11-24 DIAGNOSIS — O99.611: ICD-10-CM

## 2019-11-24 DIAGNOSIS — F41.9: ICD-10-CM

## 2019-11-24 DIAGNOSIS — O99.341: ICD-10-CM

## 2019-11-24 DIAGNOSIS — R10.9: ICD-10-CM

## 2019-11-24 DIAGNOSIS — K21.9: ICD-10-CM

## 2019-11-24 LAB
ALBUMIN SERPL BCG-MCNC: 3.8 G/DL (ref 3.5–5)
ALP SERPL-CCNC: 46 U/L (ref 40–110)
ALT SERPL W P-5'-P-CCNC: 22 U/L (ref 8–55)
ANION GAP SERPL CALC-SCNC: 14 MMOL/L (ref 10–20)
AST SERPL-CCNC: 20 U/L (ref 5–34)
BASOPHILS # BLD AUTO: 0.1 THOU/UL (ref 0–0.2)
BASOPHILS NFR BLD AUTO: 0.9 % (ref 0–1)
BILIRUB SERPL-MCNC: (no result) MG/DL (ref 0.2–1.2)
BUN SERPL-MCNC: 9 MG/DL (ref 7–18.7)
CALCIUM SERPL-MCNC: 9.5 MG/DL (ref 7.8–10.44)
CHLORIDE SERPL-SCNC: 104 MMOL/L (ref 98–107)
CO2 SERPL-SCNC: 24 MMOL/L (ref 22–29)
CREAT CL PREDICTED SERPL C-G-VRATE: 0 ML/MIN (ref 70–130)
EOSINOPHIL # BLD AUTO: 0.1 THOU/UL (ref 0–0.7)
EOSINOPHIL NFR BLD AUTO: 1.4 % (ref 0–10)
GLOBULIN SER CALC-MCNC: 3.2 G/DL (ref 2.4–3.5)
GLUCOSE SERPL-MCNC: 86 MG/DL (ref 70–105)
HCG SERPL QL: POSITIVE
HGB BLD-MCNC: 11.7 G/DL (ref 12–16)
LIPASE SERPL-CCNC: 32 U/L (ref 8–78)
LYMPHOCYTES # BLD AUTO: 2.1 THOU/UL (ref 1.2–3.4)
LYMPHOCYTES NFR BLD AUTO: 34.7 % (ref 21–51)
MCH RBC QN AUTO: 27 PG (ref 27–31)
MCV RBC AUTO: 87.7 FL (ref 78–98)
MONOCYTES # BLD AUTO: 0.4 THOU/UL (ref 0.11–0.59)
MONOCYTES NFR BLD AUTO: 5.7 % (ref 0–10)
NEUTROPHILS # BLD AUTO: 3.5 THOU/UL (ref 1.4–6.5)
NEUTROPHILS NFR BLD AUTO: 57.2 % (ref 42–75)
PLATELET # BLD AUTO: 305 THOU/UL (ref 130–400)
POTASSIUM SERPL-SCNC: 3.8 MMOL/L (ref 3.5–5.1)
PREGS CONTROL BACKGROUND?: (no result)
PREGS CONTROL BAR APPEAR?: YES
RBC # BLD AUTO: 4.34 MILL/UL (ref 4.2–5.4)
SODIUM SERPL-SCNC: 138 MMOL/L (ref 136–145)
WBC # BLD AUTO: 6 THOU/UL (ref 4.8–10.8)

## 2019-11-24 PROCEDURE — 96361 HYDRATE IV INFUSION ADD-ON: CPT

## 2019-11-24 PROCEDURE — 85025 COMPLETE CBC W/AUTO DIFF WBC: CPT

## 2019-11-24 PROCEDURE — 84703 CHORIONIC GONADOTROPIN ASSAY: CPT

## 2019-11-24 PROCEDURE — 83690 ASSAY OF LIPASE: CPT

## 2019-11-24 PROCEDURE — 96360 HYDRATION IV INFUSION INIT: CPT

## 2019-11-24 PROCEDURE — 80053 COMPREHEN METABOLIC PANEL: CPT

## 2019-11-24 PROCEDURE — 83605 ASSAY OF LACTIC ACID: CPT

## 2019-11-24 PROCEDURE — 93005 ELECTROCARDIOGRAM TRACING: CPT
